# Patient Record
Sex: MALE | Race: WHITE | NOT HISPANIC OR LATINO | Employment: STUDENT | URBAN - METROPOLITAN AREA
[De-identification: names, ages, dates, MRNs, and addresses within clinical notes are randomized per-mention and may not be internally consistent; named-entity substitution may affect disease eponyms.]

---
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
## 2024-05-04 ENCOUNTER — APPOINTMENT (OUTPATIENT)
Dept: URGENT CARE | Facility: CLINIC | Age: 19
End: 2024-05-04
Payer: COMMERCIAL

## 2024-05-04 ENCOUNTER — HOSPITAL ENCOUNTER (OUTPATIENT)
Facility: MEDICAL CENTER | Age: 19
End: 2024-05-04
Attending: NURSE PRACTITIONER
Payer: COMMERCIAL

## 2024-05-04 ENCOUNTER — OFFICE VISIT (OUTPATIENT)
Dept: URGENT CARE | Facility: CLINIC | Age: 19
End: 2024-05-04
Payer: COMMERCIAL

## 2024-05-04 VITALS
RESPIRATION RATE: 18 BRPM | WEIGHT: 189.8 LBS | TEMPERATURE: 97.4 F | HEART RATE: 101 BPM | SYSTOLIC BLOOD PRESSURE: 120 MMHG | HEIGHT: 73 IN | DIASTOLIC BLOOD PRESSURE: 74 MMHG | BODY MASS INDEX: 25.15 KG/M2 | OXYGEN SATURATION: 99 %

## 2024-05-04 DIAGNOSIS — H10.32 ACUTE BACTERIAL CONJUNCTIVITIS OF LEFT EYE: ICD-10-CM

## 2024-05-04 DIAGNOSIS — J02.9 PHARYNGITIS, UNSPECIFIED ETIOLOGY: ICD-10-CM

## 2024-05-04 LAB — S PYO DNA SPEC NAA+PROBE: NOT DETECTED

## 2024-05-04 PROCEDURE — 3074F SYST BP LT 130 MM HG: CPT | Performed by: NURSE PRACTITIONER

## 2024-05-04 PROCEDURE — 99213 OFFICE O/P EST LOW 20 MIN: CPT | Performed by: NURSE PRACTITIONER

## 2024-05-04 PROCEDURE — 3078F DIAST BP <80 MM HG: CPT | Performed by: NURSE PRACTITIONER

## 2024-05-04 PROCEDURE — 87651 STREP A DNA AMP PROBE: CPT | Performed by: NURSE PRACTITIONER

## 2024-05-04 RX ORDER — POLYMYXIN B SULFATE AND TRIMETHOPRIM 1; 10000 MG/ML; [USP'U]/ML
1 SOLUTION OPHTHALMIC EVERY 4 HOURS
Qty: 10 ML | Refills: 0 | Status: SHIPPED | OUTPATIENT
Start: 2024-05-04 | End: 2024-05-11

## 2024-05-04 ASSESSMENT — ENCOUNTER SYMPTOMS
SHORTNESS OF BREATH: 0
RESPIRATORY NEGATIVE: 1
EYE REDNESS: 1
COUGH: 0
FEVER: 1
DOUBLE VISION: 0
BLURRED VISION: 0
SORE THROAT: 1
EYE DISCHARGE: 1

## 2024-05-04 ASSESSMENT — VISUAL ACUITY: OU: 1

## 2024-05-04 NOTE — PROGRESS NOTES
Subjective:     Sarkis Mariscal is a 18 y.o. male who presents for Pharyngitis (X2days ), Fever, and Conjunctivitis (/)       Pharyngitis   This is a new problem. Episode onset: Thursday. The problem has been gradually improving. Associated symptoms include ear pain. Pertinent negatives include no congestion, coughing or shortness of breath. Treatments tried: OTC cough meds.   Fever   Associated symptoms include ear pain and a sore throat. Pertinent negatives include no congestion or coughing.   Conjunctivitis  This is a new problem. The current episode started today. The problem has been gradually worsening. Associated symptoms include a fever and a sore throat. Pertinent negatives include no congestion or coughing.     Also reports experiencing itching at the left eye.  No injury.  Did rub his eye.  Today, started to experience goopy discharge and redness.    Review of Systems   Constitutional:  Positive for fever. Negative for malaise/fatigue.   HENT:  Positive for ear pain and sore throat. Negative for congestion.    Eyes:  Positive for discharge and redness. Negative for blurred vision and double vision.        Left   Respiratory: Negative.  Negative for cough and shortness of breath.    All other systems reviewed and are negative.    Refer to HPI for additional details.    During this visit, appropriate PPE was worn, and hand hygiene was performed.    PMH:  has no past medical history on file.    MEDS:   Current Outpatient Medications:     polymixin-trimethoprim (POLYTRIM) 46001-9.1 UNIT/ML-% Solution, Administer 1 Drop into the left eye every 4 hours for 7 days., Disp: 10 mL, Rfl: 0    IBUPROFEN PO, Take  by mouth. (Patient not taking: Reported on 5/4/2024), Disp: , Rfl:     FLUoxetine (PROZAC) 10 MG Cap, Take 10 mg by mouth every day. (Patient not taking: Reported on 5/4/2024), Disp: , Rfl:     ALLERGIES: No Known Allergies  SURGHX: History reviewed. No pertinent surgical history.  SOCHX:      FH: Per HPI as  "applicable/pertinent.      Objective:     /74   Pulse (!) 101   Temp 36.3 °C (97.4 °F) (Temporal)   Resp 18   Ht 1.854 m (6' 1\")   Wt 86.1 kg (189 lb 12.8 oz)   SpO2 99%   BMI 25.04 kg/m²     Physical Exam  Nursing note reviewed.   Constitutional:       General: He is not in acute distress.     Appearance: He is well-developed. He is not ill-appearing or toxic-appearing.   HENT:      Head: No right periorbital erythema or left periorbital erythema.      Right Ear: Tympanic membrane normal.      Left Ear: Tympanic membrane normal.      Mouth/Throat:      Mouth: Mucous membranes are moist.      Pharynx: Pharyngeal swelling and posterior oropharyngeal erythema present.      Tonsils: Tonsillar exudate present. 3+ on the right. 3+ on the left.   Eyes:      General: Lids are normal. Vision grossly intact.         Right eye: No discharge.         Left eye: No discharge.      Conjunctiva/sclera:      Right eye: Right conjunctiva is not injected.      Left eye: Left conjunctiva is injected.      Pupils: Pupils are equal, round, and reactive to light.   Neck:      Trachea: Phonation normal.   Cardiovascular:      Rate and Rhythm: Normal rate.   Pulmonary:      Effort: Pulmonary effort is normal. No respiratory distress.   Musculoskeletal:         General: No deformity. Normal range of motion.   Skin:     General: Skin is warm and dry.      Coloration: Skin is not pale.   Neurological:      Mental Status: He is alert and oriented to person, place, and time.      Motor: No weakness.   Psychiatric:         Behavior: Behavior normal. Behavior is cooperative.     POCT Cepheid Group A Strep by PCR: negative      Assessment/Plan:     1. Pharyngitis, unspecified etiology  - POCT Cepheid Group A Strep - PCR  - CULTURE THROAT; Future    2. Acute bacterial conjunctivitis of left eye  - polymixin-trimethoprim (POLYTRIM) 84356-8.1 UNIT/ML-% Solution; Administer 1 Drop into the left eye every 4 hours for 7 days.  Dispense: 10 " mL; Refill: 0    Discussed likely self-limiting viral etiology for sore throat and expected course and duration of illness. Vital signs stable, afebrile, no acute distress at this time.    Emphasize supportive measures, rest, fluids, gargles, and symptom management with over-the-counter medication as needed such as ibuprofen, acetaminophen, lozenges, and sprays.    Monitor. Follow up in 7 days if symptoms do not improve or sooner if symptoms change or worsen. Consider mono testing if symptoms persist. Throat culture pending.    Differential diagnosis, natural history, supportive care, over-the-counter symptom management per 's instructions, close monitoring, and indications for immediate follow-up discussed.     All questions answered. Patient agrees with the plan of care.    School/work note provided.

## 2024-05-04 NOTE — LETTER
May 4, 2024         Patient: Sarkis Mariscal   YOB: 2005   Date of Visit: 5/4/2024           To Whom it May Concern:    Sarkis Mariscal was seen in my clinic on 5/4/2024 due to illness. Due to medical necessity, please excuse patient from work/school 5/4 and 5/5.    If you have any questions or concerns, please don't hesitate to call.        Sincerely,         RADHA Isbell.  Electronically Signed

## 2024-05-06 ENCOUNTER — TELEPHONE (OUTPATIENT)
Dept: URGENT CARE | Facility: PHYSICIAN GROUP | Age: 19
End: 2024-05-06
Payer: COMMERCIAL

## 2024-05-06 DIAGNOSIS — A49.2 HAEMOPHILUS INFLUENZAE INFECTION: ICD-10-CM

## 2024-05-06 DIAGNOSIS — J02.9 PHARYNGITIS, UNSPECIFIED ETIOLOGY: ICD-10-CM

## 2024-05-06 LAB
BACTERIA SPEC RESP CULT: ABNORMAL
BACTERIA SPEC RESP CULT: ABNORMAL
SIGNIFICANT IND 70042: ABNORMAL
SITE SITE: ABNORMAL
SOURCE SOURCE: ABNORMAL

## 2024-05-06 RX ORDER — AMOXICILLIN AND CLAVULANATE POTASSIUM 875; 125 MG/1; MG/1
1 TABLET, FILM COATED ORAL 2 TIMES DAILY
Qty: 14 TABLET | Refills: 0 | Status: SHIPPED | OUTPATIENT
Start: 2024-05-06 | End: 2024-05-13

## 2024-05-06 NOTE — TELEPHONE ENCOUNTER
Spoke with patient over the phone regarding recent throat culture result.  Positive for haemophilus influenzae.  Patient reports sore throat improving.  However, symptoms persist.  Recommend starting antibiotics.  Patient amenable.  Sent electronically to pharmacy.  Continue eyedrops for pinkeye.  Patient reports those symptoms have been improving.  Advised to use OTC acetaminophen and ibuprofen per 's instructions as needed for sore throat.  Monitor.  Return precautions advised.  All questions answered.

## 2024-08-15 ENCOUNTER — APPOINTMENT (RX ONLY)
Dept: URBAN - METROPOLITAN AREA CLINIC 108 | Facility: CLINIC | Age: 19
Setting detail: DERMATOLOGY
End: 2024-08-15

## 2024-08-15 DIAGNOSIS — L65.9 NONSCARRING HAIR LOSS, UNSPECIFIED: ICD-10-CM

## 2024-08-15 DIAGNOSIS — D485 NEOPLASM OF UNCERTAIN BEHAVIOR OF SKIN: ICD-10-CM

## 2024-08-15 DIAGNOSIS — L72.0 EPIDERMAL CYST: ICD-10-CM

## 2024-08-15 PROBLEM — D48.5 NEOPLASM OF UNCERTAIN BEHAVIOR OF SKIN: Status: ACTIVE | Noted: 2024-08-15

## 2024-08-15 PROCEDURE — ? ORDER ULTRASOUND

## 2024-08-15 PROCEDURE — 99203 OFFICE O/P NEW LOW 30 MIN: CPT

## 2024-08-15 PROCEDURE — ? COUNSELING

## 2024-08-15 PROCEDURE — ? ADDITIONAL NOTES

## 2024-08-15 ASSESSMENT — LOCATION SIMPLE DESCRIPTION DERM
LOCATION SIMPLE: RIGHT SCALP
LOCATION SIMPLE: RIGHT SCALP
LOCATION SIMPLE: HAIR
LOCATION SIMPLE: RIGHT TEMPLE

## 2024-08-15 ASSESSMENT — LOCATION ZONE DERM
LOCATION ZONE: SCALP
LOCATION ZONE: SCALP
LOCATION ZONE: FACE

## 2024-08-15 ASSESSMENT — LOCATION DETAILED DESCRIPTION DERM
LOCATION DETAILED: RIGHT MEDIAL FRONTAL SCALP
LOCATION DETAILED: HAIR
LOCATION DETAILED: RIGHT LATERAL TEMPLE
LOCATION DETAILED: RIGHT MEDIAL FRONTAL SCALP

## 2024-08-15 NOTE — PROCEDURE: ADDITIONAL NOTES
Detail Level: Simple
Additional Notes: Offered kenalog injection. Patient declined and requested to monitor for new or worsening symptoms
Render Risk Assessment In Note?: no
Additional Notes: Monitor
Additional Notes: Paper referral provided to patient

## 2024-08-15 NOTE — PROCEDURE: ORDER ULTRASOUND
Detail Level: Simple
Breast Ultrasound Reason: R/O: Lipoma Vs Cyst
Provider: Quirino Hall PA-C
Priority: normal
Ultrasound Protocol: Ultrasound of Subcutaneous Mass
Skin Lesion Ultrasound Reason: R/O lymph node vs cyst vs lipoma
Other Ultrasound Protocol Reason: Size 2.0 cm RO: cyst vs lipoma
Neck Ultrasound Reason: Rule out cyst vs lymph node vs lipoma

## 2024-09-03 ENCOUNTER — APPOINTMENT (RX ONLY)
Dept: URBAN - METROPOLITAN AREA CLINIC 108 | Facility: CLINIC | Age: 19
Setting detail: DERMATOLOGY
End: 2024-09-03

## 2024-09-03 DIAGNOSIS — D485 NEOPLASM OF UNCERTAIN BEHAVIOR OF SKIN: ICD-10-CM

## 2024-09-03 DIAGNOSIS — L72.0 EPIDERMAL CYST: ICD-10-CM

## 2024-09-03 PROBLEM — D48.5 NEOPLASM OF UNCERTAIN BEHAVIOR OF SKIN: Status: ACTIVE | Noted: 2024-09-03

## 2024-09-03 PROCEDURE — ? ADDITIONAL NOTES

## 2024-09-03 PROCEDURE — ? ORDER ULTRASOUND

## 2024-09-03 PROCEDURE — ? COUNSELING

## 2024-09-03 PROCEDURE — 99212 OFFICE O/P EST SF 10 MIN: CPT

## 2024-09-03 ASSESSMENT — LOCATION ZONE DERM: LOCATION ZONE: FACE

## 2024-09-03 ASSESSMENT — LOCATION SIMPLE DESCRIPTION DERM: LOCATION SIMPLE: RIGHT TEMPLE

## 2024-09-03 ASSESSMENT — LOCATION DETAILED DESCRIPTION DERM: LOCATION DETAILED: RIGHT LATERAL TEMPLE

## 2025-05-04 ENCOUNTER — OFFICE VISIT (OUTPATIENT)
Dept: URGENT CARE | Facility: PHYSICIAN GROUP | Age: 20
End: 2025-05-04
Payer: COMMERCIAL

## 2025-05-04 VITALS
SYSTOLIC BLOOD PRESSURE: 126 MMHG | RESPIRATION RATE: 16 BRPM | HEIGHT: 73 IN | DIASTOLIC BLOOD PRESSURE: 76 MMHG | HEART RATE: 84 BPM | OXYGEN SATURATION: 97 % | WEIGHT: 90.8 LBS | BODY MASS INDEX: 12.04 KG/M2 | TEMPERATURE: 96.8 F

## 2025-05-04 DIAGNOSIS — M54.50 ACUTE MIDLINE LOW BACK PAIN WITHOUT SCIATICA: ICD-10-CM

## 2025-05-04 PROCEDURE — 99213 OFFICE O/P EST LOW 20 MIN: CPT

## 2025-05-04 RX ORDER — KETOROLAC TROMETHAMINE 15 MG/ML
15 INJECTION, SOLUTION INTRAMUSCULAR; INTRAVENOUS ONCE
Status: COMPLETED | OUTPATIENT
Start: 2025-05-04 | End: 2025-05-04

## 2025-05-04 RX ORDER — LIDOCAINE 4 G/G
1 PATCH TOPICAL EVERY 24 HOURS
Qty: 6 PATCH | Refills: 0 | Status: SHIPPED | OUTPATIENT
Start: 2025-05-04 | End: 2025-05-10

## 2025-05-04 RX ORDER — ACETAMINOPHEN 500 MG
1000 TABLET ORAL ONCE
Status: COMPLETED | OUTPATIENT
Start: 2025-05-04 | End: 2025-05-04

## 2025-05-04 RX ADMIN — Medication 1000 MG: at 09:03

## 2025-05-04 RX ADMIN — KETOROLAC TROMETHAMINE 15 MG: 15 INJECTION, SOLUTION INTRAMUSCULAR; INTRAVENOUS at 09:03

## 2025-05-04 ASSESSMENT — ENCOUNTER SYMPTOMS
CONSTIPATION: 0
NEUROLOGICAL NEGATIVE: 1
TINGLING: 0
FEVER: 0
FALLS: 0
GASTROINTESTINAL NEGATIVE: 1
WEAKNESS: 0
SHORTNESS OF BREATH: 0
SENSORY CHANGE: 0
CHILLS: 0
EYES NEGATIVE: 1
COUGH: 0
BACK PAIN: 1

## 2025-05-04 NOTE — PROGRESS NOTES
"Subjective:     Chief Complaint   Patient presents with    Back Pain     Sharp pain near pelvis after getting up from the couch yesterday       HPI:  Sarkis Mariscal is a 19 y.o. male who presents for Low back pain. Reports he experienced a sharp pain after getting up off the couch yesterday. Report pain is worsened by sitting or laying, improved by walking. Denies hx of low back pain or back injury. Denies fever, chills, malaise, hematuria, saddle anesthesia, numbness or tingling, unilateral weakness, or loss of bowel or bladder. No trauma, IV drug use, or history of cancer/malignancy.      ROS:  Review of Systems   Constitutional:  Negative for chills and fever.   HENT: Negative.     Eyes: Negative.    Respiratory:  Negative for cough and shortness of breath.    Cardiovascular:  Negative for chest pain.   Gastrointestinal: Negative.  Negative for constipation.   Genitourinary: Negative.    Musculoskeletal:  Positive for back pain. Negative for falls.   Skin:  Negative for rash.   Neurological: Negative.  Negative for tingling, sensory change and weakness.   All other systems reviewed and are negative.       CURRENT MEDICATIONS:  Current Outpatient Medications   Medication Sig Refill Last Dispense    diclofenac sodium (VOLTAREN) 1 % Gel Apply 4 g topically 4 times a day as needed (pain) for up to 10 days. 0 Unknown (outside pharmacy)    lidocaine (ASPERFLEX) 4 % Patch Place 1 Patch on the skin every 24 hours for 6 days. 0 Unknown (outside pharmacy)    tizanidine (ZANAFLEX) 4 MG Tab Take 0.5 Tablets by mouth every 6 hours as needed (for muscle spasm and pain). 0 Unknown (outside pharmacy)       ALLERGIES:   No Known Allergies    PROBLEM LIST:    does not have a problem list on file.    Allergies, Medications, & Tobacco/Substance Use were reconciled by the Medical Assistant and reviewed by myself.     Objective:   /76   Pulse 84   Temp 36 °C (96.8 °F)   Resp 16   Ht 1.854 m (6' 1\")   Wt 41.2 kg (90 lb 12.8 oz) "   SpO2 97%   BMI 11.98 kg/m²     Physical Exam  Constitutional:       General: He is not in acute distress.     Appearance: He is not ill-appearing or toxic-appearing.   Cardiovascular:      Rate and Rhythm: Normal rate and regular rhythm.   Pulmonary:      Effort: Pulmonary effort is normal.      Breath sounds: Normal breath sounds.   Musculoskeletal:      Cervical back: Normal.      Thoracic back: Normal.      Lumbar back: Spasms and tenderness present. No swelling, edema, deformity, signs of trauma, lacerations or bony tenderness. Normal range of motion. Negative right straight leg raise test and negative left straight leg raise test.        Back:    Skin:     General: Skin is warm and dry.   Neurological:      Mental Status: He is alert.      Sensory: Sensation is intact.      Motor: Motor function is intact.      Coordination: Coordination is intact.      Gait: Gait is intact.         Assessment/Plan:   Pt's history and physical exam consistent with acute low back pain. No red flags were appreciated on both history and physical exam. Presentation is not concerning due to lack of history and unremarkable physical exam. I do not believe further testing or imaging is necessary at this time. Supportive care and red flag symptoms discussed.   Assessment & Plan  Acute midline low back pain without sciatica  Orders:    acetaminophen (Tylenol) tablet 1,000 mg    ketorolac (Toradol) 15 MG/ML injection 15 mg    tizanidine (ZANAFLEX) 4 MG Tab; Take 0.5 Tablets by mouth every 6 hours as needed (for muscle spasm and pain).    lidocaine (ASPERFLEX) 4 % Patch; Place 1 Patch on the skin every 24 hours for 6 days.    diclofenac sodium (VOLTAREN) 1 % Gel; Apply 4 g topically 4 times a day as needed (pain) for up to 10 days.  - Encouraged to take OTC ibuprofen or tylenol (if no contraindications) per package instructions for pain for 7 days or until symptoms improve.   - Pt educated to use heat or ice, whichever feels better,  and do gentle ROM back stretches and exercises.   - Pt encouraged to resume activity as tolerated, rest as needed, but stay active as much as possible.   - Education provided to follow up with PCP as needed and to seek care if pain does not improve in 4 weeks, numbness/tingling or any other worsening concerns. Follow up emergently for incontinence/retention of bowel or bladder, numbness or tingling in the groin, or progressing or severe weakness.     Discussed differential diagnosis, management options, risks/benefits, and alternatives to planned treatment. Pt expressed understanding and the treatment plan was agreed upon. Questions were encouraged and answered. Pt encouraged to return to urgent care as needed if new or worsening symptoms or if there is no improvement in condition. Pt educated in red flags and indications to immediately call 911 or present to the Emergency Department. Advised the patient to follow-up with the primary care physician for recheck, reevaluation, and further management.    I personally reviewed prior external notes and test results pertinent to today's visit. I have independently reviewed and interpreted all diagnostics ordered during this visit.    Please note that this dictation was created using voice recognition software. I have made a reasonable attempt to correct obvious errors, but I expect that there are errors of grammar and possibly content that I did not discover before finalizing the note.    This note was electronically signed by LORRIE Lambert

## 2025-06-08 ENCOUNTER — APPOINTMENT (OUTPATIENT)
Dept: RADIOLOGY | Facility: IMAGING CENTER | Age: 20
End: 2025-06-08
Attending: NURSE PRACTITIONER
Payer: COMMERCIAL

## 2025-06-08 ENCOUNTER — OFFICE VISIT (OUTPATIENT)
Dept: URGENT CARE | Facility: CLINIC | Age: 20
End: 2025-06-08
Payer: COMMERCIAL

## 2025-06-08 VITALS
HEIGHT: 73 IN | DIASTOLIC BLOOD PRESSURE: 64 MMHG | BODY MASS INDEX: 26.51 KG/M2 | WEIGHT: 200 LBS | SYSTOLIC BLOOD PRESSURE: 118 MMHG | HEART RATE: 106 BPM | TEMPERATURE: 98 F | OXYGEN SATURATION: 96 % | RESPIRATION RATE: 16 BRPM

## 2025-06-08 DIAGNOSIS — M79.672 LEFT FOOT PAIN: Primary | ICD-10-CM

## 2025-06-08 DIAGNOSIS — S92.212A DISPLACED FRACTURE OF CUBOID BONE OF LEFT FOOT, INITIAL ENCOUNTER FOR CLOSED FRACTURE: ICD-10-CM

## 2025-06-08 DIAGNOSIS — M79.672 LEFT FOOT PAIN: ICD-10-CM

## 2025-06-08 DIAGNOSIS — S92.902A CLOSED FRACTURE OF LEFT FOOT, INITIAL ENCOUNTER: ICD-10-CM

## 2025-06-08 PROCEDURE — 3074F SYST BP LT 130 MM HG: CPT | Performed by: NURSE PRACTITIONER

## 2025-06-08 PROCEDURE — 99215 OFFICE O/P EST HI 40 MIN: CPT | Performed by: NURSE PRACTITIONER

## 2025-06-08 PROCEDURE — 3078F DIAST BP <80 MM HG: CPT | Performed by: NURSE PRACTITIONER

## 2025-06-08 PROCEDURE — 73630 X-RAY EXAM OF FOOT: CPT | Mod: TC,LT | Performed by: RADIOLOGY

## 2025-06-08 RX ORDER — IBUPROFEN 400 MG/1
400 TABLET, FILM COATED ORAL EVERY 6 HOURS PRN
COMMUNITY

## 2025-06-08 RX ORDER — ESCITALOPRAM OXALATE 10 MG/1
TABLET ORAL
COMMUNITY
Start: 2025-06-08

## 2025-06-08 RX ORDER — HYDROCODONE BITARTRATE AND ACETAMINOPHEN 5; 325 MG/1; MG/1
1 TABLET ORAL EVERY 4 HOURS
Qty: 18 TABLET | Refills: 0 | Status: SHIPPED | OUTPATIENT
Start: 2025-06-08 | End: 2025-06-08

## 2025-06-08 ASSESSMENT — ENCOUNTER SYMPTOMS: JOINT SWELLING: 1
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
## 2025-06-10 PROBLEM — M25.572 ACUTE LEFT ANKLE PAIN: Status: ACTIVE | Noted: 2025-06-10

## 2025-06-10 PROBLEM — M79.672 LEFT FOOT PAIN: Status: ACTIVE | Noted: 2025-06-10
